# Patient Record
Sex: MALE | Race: WHITE | NOT HISPANIC OR LATINO | URBAN - METROPOLITAN AREA
[De-identification: names, ages, dates, MRNs, and addresses within clinical notes are randomized per-mention and may not be internally consistent; named-entity substitution may affect disease eponyms.]

---

## 2024-08-27 ENCOUNTER — APPOINTMENT (OUTPATIENT)
Dept: GASTROENTEROLOGY | Facility: CLINIC | Age: 59
End: 2024-08-27
Payer: MEDICAID

## 2024-08-27 VITALS
WEIGHT: 131 LBS | HEIGHT: 70 IN | DIASTOLIC BLOOD PRESSURE: 82 MMHG | SYSTOLIC BLOOD PRESSURE: 126 MMHG | HEART RATE: 89 BPM | OXYGEN SATURATION: 98 % | BODY MASS INDEX: 18.75 KG/M2

## 2024-08-27 DIAGNOSIS — Z12.11 SCREENING FOR COLON CANCER: ICD-10-CM

## 2024-08-27 DIAGNOSIS — R19.7 DIARRHEA, UNSPECIFIED TYPE: Primary | ICD-10-CM

## 2024-08-27 PROBLEM — Z20.822 EXPOSURE TO COVID-19 VIRUS: Status: RESOLVED | Noted: 2024-08-27 | Resolved: 2024-08-27

## 2024-08-27 PROBLEM — L08.9 INFECTION OF FINGER: Status: ACTIVE | Noted: 2024-08-27

## 2024-08-27 PROBLEM — F17.200 NICOTINE DEPENDENCE, UNSPECIFIED, UNCOMPLICATED: Status: ACTIVE | Noted: 2017-09-03

## 2024-08-27 PROBLEM — M62.81 MUSCLE WEAKNESS OF UPPER EXTREMITY: Status: ACTIVE | Noted: 2024-08-27

## 2024-08-27 PROBLEM — K21.9 GERD (GASTROESOPHAGEAL REFLUX DISEASE): Status: ACTIVE | Noted: 2024-08-27

## 2024-08-27 PROBLEM — M25.531 RIGHT WRIST PAIN: Status: ACTIVE | Noted: 2024-08-27

## 2024-08-27 PROCEDURE — 99204 OFFICE O/P NEW MOD 45 MIN: CPT | Performed by: INTERNAL MEDICINE

## 2024-08-27 PROCEDURE — 3008F BODY MASS INDEX DOCD: CPT | Performed by: INTERNAL MEDICINE

## 2024-08-27 RX ORDER — OMEPRAZOLE 40 MG/1
1 CAPSULE, DELAYED RELEASE ORAL
COMMUNITY
Start: 2024-06-18 | End: 2024-08-27 | Stop reason: ALTCHOICE

## 2024-08-27 ASSESSMENT — ENCOUNTER SYMPTOMS
SORE THROAT: 0
HEADACHES: 0
FEVER: 0
ROS GI COMMENTS: AS DETAILED ABOVE.
UNEXPECTED WEIGHT CHANGE: 0
VOICE CHANGE: 0
CHILLS: 0
CONFUSION: 0
NUMBNESS: 0
COUGH: 0
TROUBLE SWALLOWING: 0
ARTHRALGIAS: 0
DYSURIA: 0
DIZZINESS: 0
ADENOPATHY: 0
PALPITATIONS: 0
SEIZURES: 0
WHEEZING: 0
BRUISES/BLEEDS EASILY: 0
SHORTNESS OF BREATH: 0
NERVOUS/ANXIOUS: 0
FATIGUE: 0
MYALGIAS: 0
WEAKNESS: 0
HEMATURIA: 0

## 2024-08-27 NOTE — PATIENT INSTRUCTIONS
I have ordered labs to look into your symptoms.        You have been scheduled for a colonoscopy.  You were given instructions for preparing for this test in the office today.  If you have questions about these instructions, please call my office at [phone].    After your procedure, you can expect me to talk to you to go over the results of the procedure. If polyps were removed I will usually be able to tell you my initial thoughts about those polyps and give you some idea of when you should have another colonoscopy.    If any polyps are removed during your procedure or if any biopsies are obtained those specimens will go to the pathologists to review under the microscope. Once those results are available they will be sent to me electronically to review. These results will also be available to you at that time through [employer]. I briefly review all of these results by the next business day to determine if there is any urgent information that needs to be communicated to you right away or anything that would significantly change what I told you at the time of the procedure. If there is nothing urgent this is usually a good sign and I will then do a more extensive review of the result and send you a letter with my final recommendations within a week of the result becoming available. If you have questions or need additional information I urge you to call the office at [phone], but I do ask for patience as I spend a good portion of each day performing procedures like the one you are scheduled for and during those times I am not able to take or return routine phone calls.    You were also given information regarding the schedule for your procedure including the time that you need to arrive to the endoscopy unit.  You will also be contacted 2-3 day prior to your procedure to confirm the final arrival time.  If you have questions about this or if you need to cancel or change this appointment please call my office  at [phone].       Follow up with GI as needed.

## 2024-08-27 NOTE — LETTER
August 27, 2024     Ricci Newton DO  [address]  [address]  [facility] [id number]    Patient: Bob Bolaños   YOB: 1965   Date of Visit: 8/27/2024       Dear Dr. Ricci Newton DO:    Thank you for referring Bob Bolaños to me for evaluation. Below are my notes for this consultation.  If you have questions, please do not hesitate to call me. I look forward to following your patient along with you.       Sincerely,     Jeremy Webster MD      CC: No Recipients  ______________________________________________________________________________________         [facility]    ASSESSMENT and PLAN:       Bob Bolaños is a 59 y.o. male with no significant past medical history who presents for consultation requested by his primary care provider (Ricci Newton DO) for the evaluation of diarrhea.       Diarrhea  Persistent symptoms of loose stool for several months. Unclear etiology. No red flag/alarm symptoms. Suspect symptoms related to dietary issues and would consider lactose intolerance. Will also check labs to evaluate for underlying celiac, EPI, etc. Underlying IBD or microscopic colitis less likely, but will evaluate for that at the time of his colonoscopy.  - labs ordered  - consider fiber supplement  - stop Omeprazole as he denies any symptoms of GERD or other symptoms where PPI treatment would be indicated, side effect of PPIs can include diarrhea      Colon cancer screening  Due for screening colonoscopy.  Essentially asymptomatic at this time.  No previous difficulties with sedation.  No antiplatelet/antithrombotic use.  - colonoscopy scheduled in endo  - OTC bowel prep discussed with patient  - instructions for procedure discussed with patient in the office today and hard copy given to patient today      Follow up with GI as needed.          Jeremy Webster MD        Senior Attending Physician, Gastroenterology    [facility] [place]  [facility]    Clinical   [facility] [employer]        Subjective   HISTORY OF PRESENT ILLNESS:     Chief Complaint  New Patient Visit and Diarrhea    History Of Present Illness:    Bob Bolaños is a 59 y.o. male with no significant past medical history who presents for consultation requested by his primary care provider (Ricci Newton DO) for the evaluation of diarrhea.     There are no notes from his PCP in the EMR. A scanned referral order says that the reason for referral is “diarrhea, screening colonoscopy”. No other records are included. There are no recent labs, imaging, or endoscopy reports in the EMR.      Today, Mr. Bolaños says that he needs to get a colonoscopy done because he has never had one before. He denies any family history of GI disease. He also denies any antiplatelet/anticoagulation.    He also says that he has had loose stools for the last 3-4 months. He will usually have 3 loose BMs in the morning and then no further BMs for the rest of the day. He has not noticed any triggers. No blood in his stool and no abdominal pain. No weight loss. He has not changed his diet in any way. He started Omeprazole about a month ago which he says was started by his PCP because of diarrhea. He deniesany history of heartburn. He takes OTC Acetaminophen, but he denies any NSAID use.    Patient denies any heartburn/GERD, N/V, dysphagia, odynophagia, abdominal pain, constipation, hematemesis, hematochezia, melena, or weight loss.      Review of systems:   Review of Systems   Constitutional:  Negative for chills, fatigue, fever and unexpected weight change.   HENT:  Negative for congestion, sneezing, sore throat, trouble swallowing and voice change.    Respiratory:  Negative for cough, shortness of breath and wheezing.    Cardiovascular:  Negative for chest pain, palpitations and leg swelling.   Gastrointestinal:         As detailed above.  "  Genitourinary:  Negative for dysuria and hematuria.   Musculoskeletal:  Negative for arthralgias and myalgias.   Skin:  Negative for pallor and rash.   Neurological:  Negative for dizziness, seizures, syncope, weakness, numbness and headaches.   Hematological:  Negative for adenopathy. Does not bruise/bleed easily.   Psychiatric/Behavioral:  Negative for confusion. The patient is not nervous/anxious.    All other systems reviewed and are negative.      I performed a complete 10 point review of systems and it is negative except as noted in HPI or above.      PAST HISTORIES:       Past Medical History:  Patient Active Problem List   Diagnosis   • Right wrist pain   • Muscle weakness of upper extremity   • Infection of finger   • Nicotine dependence, unspecified, uncomplicated   • GERD (gastroesophageal reflux disease)     He has a past medical history of Exposure to COVID-19 virus (08/27/2024).    Past Surgical History:  He has no past surgical history on file.      Social History:  He reports that he has been smoking cigarettes. He has never used smokeless tobacco. He reports current alcohol use of about 2.0 standard drinks of alcohol per week. He reports that he does not use drugs.    Family History:  No known family history of GI disease, specifically denies any family history of pancreatitis, Crohn's, colon cancer, gastroesophageal cancer, or ulcerative colitis.    No family history on file.     Allergies:  Patient has no known allergies.      Objective   OBJECTIVE:       Last Recorded Vitals:  Vitals:    08/27/24 1528   BP: 126/82   Pulse: 89   SpO2: 98%   Weight: 59.4 kg (131 lb)   Height: 1.778 m (5' 10\")     /82   Pulse 89   Ht 1.778 m (5' 10\")   Wt 59.4 kg (131 lb)   SpO2 98%   BMI 18.80 kg/m²      Physical Exam:    Physical Exam  Vitals reviewed.   Constitutional:       General: He is not in acute distress.     Appearance: He is not ill-appearing.      Comments: thin   HENT:      Head: " "Normocephalic and atraumatic.   Eyes:      General: No scleral icterus.  Cardiovascular:      Rate and Rhythm: Normal rate and regular rhythm.      Pulses: Normal pulses.      Heart sounds: Normal heart sounds. No murmur heard.  Pulmonary:      Effort: Pulmonary effort is normal. No respiratory distress.      Breath sounds: Normal breath sounds. No wheezing.   Abdominal:      General: Bowel sounds are normal.      Palpations: Abdomen is soft.      Tenderness: There is no abdominal tenderness. There is no rebound.   Musculoskeletal:         General: No swelling or deformity.   Skin:     General: Skin is warm and dry.      Coloration: Skin is not jaundiced.      Findings: No rash.   Neurological:      General: No focal deficit present.      Mental Status: He is alert and oriented to person, place, and time.   Psychiatric:         Mood and Affect: Mood normal.         Behavior: Behavior normal.         Thought Content: Thought content normal.         Judgment: Judgment normal.         Home Medications:  Prior to Admission medications    Not on File         Relevant Results Recent labs reviewed in the EMR.    No results found for: \"WBC\", \"HGB\", \"MCV\", \"PLT\", \"IRON\", \"TIBC\", \"IRONSAT\", \"FERRITIN\", \"[id number]\", \"FOLATE\"    No results found for: \"NA\", \"K\", \"CL\", \"BUN\", \"CREATININE\"    No results found for: \"BILITOT\", \"BILIDIR\", \"ALKPHOS\", \"AST\", \"ALT\", \"LIPASE\"    No results found for: \"CRP\", \"CALPS\"    Radiology: Imaging reviewed in the EMR.  No results found.        "

## 2024-08-27 NOTE — PROGRESS NOTES
[facility]    ASSESSMENT and PLAN:       Bob Bolaños is a 59 y.o. male with no significant past medical history who presents for consultation requested by his primary care provider (Ricci Newton DO) for the evaluation of diarrhea.       Diarrhea  Persistent symptoms of loose stool for several months. Unclear etiology. No red flag/alarm symptoms. Suspect symptoms related to dietary issues and would consider lactose intolerance. Will also check labs to evaluate for underlying celiac, EPI, etc. Underlying IBD or microscopic colitis less likely, but will evaluate for that at the time of his colonoscopy.  - labs ordered  - consider fiber supplement  - stop Omeprazole as he denies any symptoms of GERD or other symptoms where PPI treatment would be indicated, side effect of PPIs can include diarrhea      Colon cancer screening  Due for screening colonoscopy.  Essentially asymptomatic at this time.  No previous difficulties with sedation.  No antiplatelet/antithrombotic use.  - colonoscopy scheduled in endo  - OTC bowel prep discussed with patient  - instructions for procedure discussed with patient in the office today and hard copy given to patient today      Follow up with GI as needed.          Jeremy Webster MD        Senior Attending Physician, Gastroenterology    [facility] [place] [facility]    Clinical   [facility] [employer]        Subjective   HISTORY OF PRESENT ILLNESS:     Chief Complaint  New Patient Visit and Diarrhea    History Of Present Illness:    Bob Bolaños is a 59 y.o. male with no significant past medical history who presents for consultation requested by his primary care provider (Ricci Newton DO) for the evaluation of diarrhea.     There are no notes from his PCP in the EMR. A scanned referral order says that the reason for referral is “diarrhea, screening colonoscopy”. No  other records are included. There are no recent labs, imaging, or endoscopy reports in the EMR.      Today, Mr. Bolaños says that he needs to get a colonoscopy done because he has never had one before. He denies any family history of GI disease. He also denies any antiplatelet/anticoagulation.    He also says that he has had loose stools for the last 3-4 months. He will usually have 3 loose BMs in the morning and then no further BMs for the rest of the day. He has not noticed any triggers. No blood in his stool and no abdominal pain. No weight loss. He has not changed his diet in any way. He started Omeprazole about a month ago which he says was started by his PCP because of diarrhea. He deniesany history of heartburn. He takes OTC Acetaminophen, but he denies any NSAID use.    Patient denies any heartburn/GERD, N/V, dysphagia, odynophagia, abdominal pain, constipation, hematemesis, hematochezia, melena, or weight loss.      Review of systems:   Review of Systems   Constitutional:  Negative for chills, fatigue, fever and unexpected weight change.   HENT:  Negative for congestion, sneezing, sore throat, trouble swallowing and voice change.    Respiratory:  Negative for cough, shortness of breath and wheezing.    Cardiovascular:  Negative for chest pain, palpitations and leg swelling.   Gastrointestinal:         As detailed above.   Genitourinary:  Negative for dysuria and hematuria.   Musculoskeletal:  Negative for arthralgias and myalgias.   Skin:  Negative for pallor and rash.   Neurological:  Negative for dizziness, seizures, syncope, weakness, numbness and headaches.   Hematological:  Negative for adenopathy. Does not bruise/bleed easily.   Psychiatric/Behavioral:  Negative for confusion. The patient is not nervous/anxious.    All other systems reviewed and are negative.      I performed a complete 10 point review of systems and it is negative except as noted in HPI or above.      PAST HISTORIES:       Past Medical  "History:  Patient Active Problem List   Diagnosis    Right wrist pain    Muscle weakness of upper extremity    Infection of finger    Nicotine dependence, unspecified, uncomplicated    GERD (gastroesophageal reflux disease)     He has a past medical history of Exposure to COVID-19 virus (08/27/2024).    Past Surgical History:  He has no past surgical history on file.      Social History:  He reports that he has been smoking cigarettes. He has never used smokeless tobacco. He reports current alcohol use of about 2.0 standard drinks of alcohol per week. He reports that he does not use drugs.    Family History:  No known family history of GI disease, specifically denies any family history of pancreatitis, Crohn's, colon cancer, gastroesophageal cancer, or ulcerative colitis.    No family history on file.     Allergies:  Patient has no known allergies.      Objective   OBJECTIVE:       Last Recorded Vitals:  Vitals:    08/27/24 1528   BP: 126/82   Pulse: 89   SpO2: 98%   Weight: 59.4 kg (131 lb)   Height: 1.778 m (5' 10\")     /82   Pulse 89   Ht 1.778 m (5' 10\")   Wt 59.4 kg (131 lb)   SpO2 98%   BMI 18.80 kg/m²      Physical Exam:    Physical Exam  Vitals reviewed.   Constitutional:       General: He is not in acute distress.     Appearance: He is not ill-appearing.      Comments: thin   HENT:      Head: Normocephalic and atraumatic.   Eyes:      General: No scleral icterus.  Cardiovascular:      Rate and Rhythm: Normal rate and regular rhythm.      Pulses: Normal pulses.      Heart sounds: Normal heart sounds. No murmur heard.  Pulmonary:      Effort: Pulmonary effort is normal. No respiratory distress.      Breath sounds: Normal breath sounds. No wheezing.   Abdominal:      General: Bowel sounds are normal.      Palpations: Abdomen is soft.      Tenderness: There is no abdominal tenderness. There is no rebound.   Musculoskeletal:         General: No swelling or deformity.   Skin:     General: Skin is warm " "and dry.      Coloration: Skin is not jaundiced.      Findings: No rash.   Neurological:      General: No focal deficit present.      Mental Status: He is alert and oriented to person, place, and time.   Psychiatric:         Mood and Affect: Mood normal.         Behavior: Behavior normal.         Thought Content: Thought content normal.         Judgment: Judgment normal.         Home Medications:  Prior to Admission medications    Not on File         Relevant Results Recent labs reviewed in the EMR.    No results found for: \"WBC\", \"HGB\", \"MCV\", \"PLT\", \"IRON\", \"TIBC\", \"IRONSAT\", \"FERRITIN\", \"[id number]\", \"FOLATE\"    No results found for: \"NA\", \"K\", \"CL\", \"BUN\", \"CREATININE\"    No results found for: \"BILITOT\", \"BILIDIR\", \"ALKPHOS\", \"AST\", \"ALT\", \"LIPASE\"    No results found for: \"CRP\", \"CALPS\"    Radiology: Imaging reviewed in the EMR.  No results found.        "

## 2024-08-27 NOTE — H&P (VIEW-ONLY)
[facility]    ASSESSMENT and PLAN:       Bob Bolaños is a 59 y.o. male with no significant past medical history who presents for consultation requested by his primary care provider (Ricci Newton DO) for the evaluation of diarrhea.       Diarrhea  Persistent symptoms of loose stool for several months. Unclear etiology. No red flag/alarm symptoms. Suspect symptoms related to dietary issues and would consider lactose intolerance. Will also check labs to evaluate for underlying celiac, EPI, etc. Underlying IBD or microscopic colitis less likely, but will evaluate for that at the time of his colonoscopy.  - labs ordered  - consider fiber supplement  - stop Omeprazole as he denies any symptoms of GERD or other symptoms where PPI treatment would be indicated, side effect of PPIs can include diarrhea      Colon cancer screening  Due for screening colonoscopy.  Essentially asymptomatic at this time.  No previous difficulties with sedation.  No antiplatelet/antithrombotic use.  - colonoscopy scheduled in endo  - OTC bowel prep discussed with patient  - instructions for procedure discussed with patient in the office today and hard copy given to patient today      Follow up with GI as needed.          Jeremy Webster MD        Senior Attending Physician, Gastroenterology    [facility] [place] [facility] [place] [place]    Clinical   [facility] [employer]        Subjective   HISTORY OF PRESENT ILLNESS:     Chief Complaint  New Patient Visit and Diarrhea    History Of Present Illness:    Bob Bolaños is a 59 y.o. male with no significant past medical history who presents for consultation requested by his primary care provider (Ricci Newton DO) for the evaluation of diarrhea.     There are no notes from his PCP in the EMR. A scanned referral order says that the reason for referral is “diarrhea, screening colonoscopy”. No  other records are included. There are no recent labs, imaging, or endoscopy reports in the EMR.      Today, Mr. Bolaños says that he needs to get a colonoscopy done because he has never had one before. He denies any family history of GI disease. He also denies any antiplatelet/anticoagulation.    He also says that he has had loose stools for the last 3-4 months. He will usually have 3 loose BMs in the morning and then no further BMs for the rest of the day. He has not noticed any triggers. No blood in his stool and no abdominal pain. No weight loss. He has not changed his diet in any way. He started Omeprazole about a month ago which he says was started by his PCP because of diarrhea. He deniesany history of heartburn. He takes OTC Acetaminophen, but he denies any NSAID use.    Patient denies any heartburn/GERD, N/V, dysphagia, odynophagia, abdominal pain, constipation, hematemesis, hematochezia, melena, or weight loss.      Review of systems:   Review of Systems   Constitutional:  Negative for chills, fatigue, fever and unexpected weight change.   HENT:  Negative for congestion, sneezing, sore throat, trouble swallowing and voice change.    Respiratory:  Negative for cough, shortness of breath and wheezing.    Cardiovascular:  Negative for chest pain, palpitations and leg swelling.   Gastrointestinal:         As detailed above.   Genitourinary:  Negative for dysuria and hematuria.   Musculoskeletal:  Negative for arthralgias and myalgias.   Skin:  Negative for pallor and rash.   Neurological:  Negative for dizziness, seizures, syncope, weakness, numbness and headaches.   Hematological:  Negative for adenopathy. Does not bruise/bleed easily.   Psychiatric/Behavioral:  Negative for confusion. The patient is not nervous/anxious.    All other systems reviewed and are negative.      I performed a complete 10 point review of systems and it is negative except as noted in HPI or above.      PAST HISTORIES:       Past Medical  "History:  Patient Active Problem List   Diagnosis    Right wrist pain    Muscle weakness of upper extremity    Infection of finger    Nicotine dependence, unspecified, uncomplicated    GERD (gastroesophageal reflux disease)     He has a past medical history of Exposure to COVID-19 virus (08/27/2024).    Past Surgical History:  He has no past surgical history on file.      Social History:  He reports that he has been smoking cigarettes. He has never used smokeless tobacco. He reports current alcohol use of about 2.0 standard drinks of alcohol per week. He reports that he does not use drugs.    Family History:  No known family history of GI disease, specifically denies any family history of pancreatitis, Crohn's, colon cancer, gastroesophageal cancer, or ulcerative colitis.    No family history on file.     Allergies:  Patient has no known allergies.      Objective   OBJECTIVE:       Last Recorded Vitals:  Vitals:    08/27/24 1528   BP: 126/82   Pulse: 89   SpO2: 98%   Weight: 59.4 kg (131 lb)   Height: 1.778 m (5' 10\")     /82   Pulse 89   Ht 1.778 m (5' 10\")   Wt 59.4 kg (131 lb)   SpO2 98%   BMI 18.80 kg/m²      Physical Exam:    Physical Exam  Vitals reviewed.   Constitutional:       General: He is not in acute distress.     Appearance: He is not ill-appearing.      Comments: thin   HENT:      Head: Normocephalic and atraumatic.   Eyes:      General: No scleral icterus.  Cardiovascular:      Rate and Rhythm: Normal rate and regular rhythm.      Pulses: Normal pulses.      Heart sounds: Normal heart sounds. No murmur heard.  Pulmonary:      Effort: Pulmonary effort is normal. No respiratory distress.      Breath sounds: Normal breath sounds. No wheezing.   Abdominal:      General: Bowel sounds are normal.      Palpations: Abdomen is soft.      Tenderness: There is no abdominal tenderness. There is no rebound.   Musculoskeletal:         General: No swelling or deformity.   Skin:     General: Skin is warm " "and dry.      Coloration: Skin is not jaundiced.      Findings: No rash.   Neurological:      General: No focal deficit present.      Mental Status: He is alert and oriented to person, place, and time.   Psychiatric:         Mood and Affect: Mood normal.         Behavior: Behavior normal.         Thought Content: Thought content normal.         Judgment: Judgment normal.         Home Medications:  Prior to Admission medications    Not on File         Relevant Results Recent labs reviewed in the EMR.    No results found for: \"WBC\", \"HGB\", \"MCV\", \"PLT\", \"IRON\", \"TIBC\", \"IRONSAT\", \"FERRITIN\", \"[id number]\", \"FOLATE\"    No results found for: \"NA\", \"K\", \"CL\", \"BUN\", \"CREATININE\"    No results found for: \"BILITOT\", \"BILIDIR\", \"ALKPHOS\", \"AST\", \"ALT\", \"LIPASE\"    No results found for: \"CRP\", \"CALPS\"    Radiology: Imaging reviewed in the EMR.  No results found.        "

## 2024-09-16 ENCOUNTER — ANESTHESIA EVENT (OUTPATIENT)
Dept: GASTROENTEROLOGY | Facility: HOSPITAL | Age: 59
End: 2024-09-16
Payer: COMMERCIAL

## 2024-09-16 ENCOUNTER — PREP FOR PROCEDURE (OUTPATIENT)
Dept: GASTROENTEROLOGY | Facility: CLINIC | Age: 59
End: 2024-09-16
Payer: COMMERCIAL

## 2024-09-16 RX ORDER — SODIUM CHLORIDE 9 MG/ML
20 INJECTION, SOLUTION INTRAVENOUS CONTINUOUS
Status: CANCELLED | OUTPATIENT
Start: 2024-09-16

## 2024-09-17 ENCOUNTER — ANESTHESIA (OUTPATIENT)
Dept: GASTROENTEROLOGY | Facility: HOSPITAL | Age: 59
End: 2024-09-17
Payer: COMMERCIAL

## 2024-09-17 ENCOUNTER — HOSPITAL ENCOUNTER (OUTPATIENT)
Dept: GASTROENTEROLOGY | Facility: HOSPITAL | Age: 59
Discharge: HOME | End: 2024-09-17
Payer: COMMERCIAL

## 2024-09-17 VITALS
RESPIRATION RATE: 16 BRPM | HEART RATE: 63 BPM | TEMPERATURE: 98.2 F | HEIGHT: 70 IN | OXYGEN SATURATION: 98 % | DIASTOLIC BLOOD PRESSURE: 94 MMHG | BODY MASS INDEX: 18.75 KG/M2 | WEIGHT: 131 LBS | SYSTOLIC BLOOD PRESSURE: 142 MMHG

## 2024-09-17 DIAGNOSIS — Z12.11 SCREENING FOR COLON CANCER: ICD-10-CM

## 2024-09-17 PROBLEM — J45.909 ASTHMA: Status: ACTIVE | Noted: 2024-09-17

## 2024-09-17 PROCEDURE — 3700000001 HC GENERAL ANESTHESIA TIME - INITIAL BASE CHARGE

## 2024-09-17 PROCEDURE — 7100000010 HC PHASE TWO TIME - EACH INCREMENTAL 1 MINUTE

## 2024-09-17 PROCEDURE — 2500000005 HC RX 250 GENERAL PHARMACY W/O HCPCS: Performed by: NURSE ANESTHETIST, CERTIFIED REGISTERED

## 2024-09-17 PROCEDURE — 2500000004 HC RX 250 GENERAL PHARMACY W/ HCPCS (ALT 636 FOR OP/ED): Performed by: NURSE ANESTHETIST, CERTIFIED REGISTERED

## 2024-09-17 PROCEDURE — 3700000002 HC GENERAL ANESTHESIA TIME - EACH INCREMENTAL 1 MINUTE

## 2024-09-17 PROCEDURE — 2500000004 HC RX 250 GENERAL PHARMACY W/ HCPCS (ALT 636 FOR OP/ED): Performed by: INTERNAL MEDICINE

## 2024-09-17 PROCEDURE — 7100000009 HC PHASE TWO TIME - INITIAL BASE CHARGE

## 2024-09-17 PROCEDURE — 45385 COLONOSCOPY W/LESION REMOVAL: CPT | Performed by: INTERNAL MEDICINE

## 2024-09-17 RX ORDER — PROPOFOL 10 MG/ML
INJECTION, EMULSION INTRAVENOUS AS NEEDED
Status: DISCONTINUED | OUTPATIENT
Start: 2024-09-17 | End: 2024-09-17

## 2024-09-17 RX ORDER — SODIUM CHLORIDE 9 MG/ML
20 INJECTION, SOLUTION INTRAVENOUS CONTINUOUS
Status: DISCONTINUED | OUTPATIENT
Start: 2024-09-17 | End: 2024-09-18 | Stop reason: HOSPADM

## 2024-09-17 RX ORDER — FENTANYL CITRATE 50 UG/ML
INJECTION, SOLUTION INTRAMUSCULAR; INTRAVENOUS AS NEEDED
Status: DISCONTINUED | OUTPATIENT
Start: 2024-09-17 | End: 2024-09-17

## 2024-09-17 RX ORDER — LIDOCAINE HYDROCHLORIDE 20 MG/ML
INJECTION, SOLUTION INFILTRATION; PERINEURAL AS NEEDED
Status: DISCONTINUED | OUTPATIENT
Start: 2024-09-17 | End: 2024-09-17

## 2024-09-17 SDOH — HEALTH STABILITY: MENTAL HEALTH: CURRENT SMOKER: 1

## 2024-09-17 ASSESSMENT — PAIN SCALES - GENERAL
PAINLEVEL_OUTOF10: 0 - NO PAIN
PAIN_LEVEL: 0
PAINLEVEL_OUTOF10: 0 - NO PAIN

## 2024-09-17 ASSESSMENT — PAIN - FUNCTIONAL ASSESSMENT
PAIN_FUNCTIONAL_ASSESSMENT: 0-10

## 2024-09-17 ASSESSMENT — COLUMBIA-SUICIDE SEVERITY RATING SCALE - C-SSRS
6. HAVE YOU EVER DONE ANYTHING, STARTED TO DO ANYTHING, OR PREPARED TO DO ANYTHING TO END YOUR LIFE?: NO
1. IN THE PAST MONTH, HAVE YOU WISHED YOU WERE DEAD OR WISHED YOU COULD GO TO SLEEP AND NOT WAKE UP?: NO
2. HAVE YOU ACTUALLY HAD ANY THOUGHTS OF KILLING YOURSELF?: NO

## 2024-09-17 NOTE — ANESTHESIA PREPROCEDURE EVALUATION
Patient: Bob Bolaños    Procedure Information       Anesthesia Start Date/Time: 09/17/24 1105    Scheduled providers: Jeremy Webster MD    Procedure: COLONOSCOPY    Location:  [facility]            Relevant Problems   Anesthesia (within normal limits)      Cardiac (within normal limits)      Neuro (within normal limits)      GI   (+) GERD (gastroesophageal reflux disease)      /Renal (within normal limits)      Liver (within normal limits)      Endocrine (within normal limits)      Hematology (within normal limits)      HEENT (within normal limits)      ID   (+) Infection of finger       Clinical information reviewed:   Tobacco  Allergies  Meds   Med Hx  Surg Hx   Fam Hx  Soc Hx        NPO Detail:  NPO/Void Status  Carbohydrate Drink Given Prior to Surgery? : Y  Date of Last Liquid: 09/17/24  Time of Last Liquid: 0400  Date of Last Solid: 09/15/24  Time of Last Solid: 2100  Last Intake Type: Clear fluids  Time of Last Void: 1014         Physical Exam    Airway  Mallampati: II  TM distance: >3 FB  Neck ROM: full     Cardiovascular - normal exam  Rhythm: regular  Rate: normal     Dental   Comments: Poor dentition   Pulmonary - normal exam     Abdominal - normal exam             Anesthesia Plan    History of general anesthesia?: yes  History of complications of general anesthesia?: no    ASA 2     MAC     The patient is a current smoker.  Patient was previously instructed to abstain from smoking on day of procedure.  Patient smoked on day of procedure.    intravenous induction   Anesthetic plan and risks discussed with patient.

## 2024-09-17 NOTE — ANESTHESIA POSTPROCEDURE EVALUATION
Patient: Bob Bolaños    Procedure Summary       Date: 09/17/24 Room / Location: [facility]    Anesthesia Start: 1105 Anesthesia Stop: 1138    Procedure: COLONOSCOPY Diagnosis: Screening for colon cancer    Scheduled Providers: Jeremy Webster MD Responsible Provider: CARIN Alaniz    Anesthesia Type: MAC ASA Status: 2            Anesthesia Type: MAC    Vitals Value Taken Time   /94 09/17/24 1157   Temp 36.8 °C (98.2 °F) 09/17/24 1157   Pulse 63 09/17/24 1157   Resp 16 09/17/24 1157   SpO2 98 % 09/17/24 1157       Anesthesia Post Evaluation    Patient location during evaluation: bedside  Patient participation: complete - patient participated  Level of consciousness: awake and alert  Pain score: 0  Pain management: adequate  Airway patency: patent  Cardiovascular status: acceptable and hemodynamically stable  Respiratory status: acceptable  Hydration status: acceptable  Postoperative Nausea and Vomiting: none        There were no known notable events for this encounter.

## 2024-09-17 NOTE — LETTER
September 23, 2024     Bob Bolaños  [address] [facility] [address]      Dear Mr. Bolaños:    Below are the results from your recent visit:        The polyps that I removed during your recent colonoscopy were tubular adenomas on pathology.  This type of polyp is not a cancer, but it is the type of polyp that could grow into a cancer over time.  Any polyps that were removed will not grow into a cancer, but having polyps like this can increase your risk of developing other polyps or eventually a cancer.  Because of that risk I would recommend that you have another colonoscopy in about 3 years to look for other polyps.     If you have any other questions or concerns please do not hesitate to call me at my office at [phone].     Repeat Colonoscopy Interval: 3 years          Sincerely,        Jeremy Webster MD                Resulted Orders   Surgical Pathology Exam   Result Value Ref Range    Case Report       Surgical Pathology                                Case: [id number]                                  Authorizing Provider:  Jeremy Webster MD        Collected:           09/17/2024 1123              Ordering Location:     [facility]    Received:            09/17/2024 [address] [place] [address]                                                                     Pathologist:           Phillip Ochoa MD                                                                  Specimen:    COLON - HEPATIC FLEXURE POLYP, x2                                                          FINAL DIAGNOSIS       A. COLON, HEPATIC FLEXURE POLYPX2:  -- FRAGMENTS OF TUBULAR ADENOMA                   By the signature on this report, the individual or group listed as making the Final Interpretation/Diagnosis certifies that they have reviewed this case.       Clinical History       Colon cancer screening Z12.11      Gross Description       A: Received in formalin, labeled with the patient's name and  "hospital number and \"1, hepatic flexure polyp, x 2\", are multiple fragments of tan, soft tissue aggregating to 1.0 x 0.4 x 0.3 cm. The specimen is submitted in toto in one cassette.  SMS         "

## 2024-09-18 NOTE — ADDENDUM NOTE
Encounter addended by: Honey Garcia RN on: 9/18/2024 1:15 PM   Actions taken: Contacts section saved, Flowsheet accepted

## 2024-09-20 NOTE — ADDENDUM NOTE
Encounter addended by: Keren Witt RN on: 9/20/2024 10:21 AM   Actions taken: Check Out activity completed

## 2024-09-23 LAB
LABORATORY COMMENT REPORT: NORMAL
PATH REPORT.FINAL DX SPEC: NORMAL
PATH REPORT.GROSS SPEC: NORMAL
PATH REPORT.RELEVANT HX SPEC: NORMAL
PATH REPORT.TOTAL CANCER: NORMAL

## 2025-05-27 ENCOUNTER — HOSPITAL ENCOUNTER (EMERGENCY)
Facility: HOSPITAL | Age: 60
Discharge: HOME | End: 2025-05-27
Payer: COMMERCIAL

## 2025-05-27 ENCOUNTER — APPOINTMENT (OUTPATIENT)
Dept: RADIOLOGY | Facility: HOSPITAL | Age: 60
End: 2025-05-27
Payer: COMMERCIAL

## 2025-05-27 VITALS
TEMPERATURE: 97.7 F | BODY MASS INDEX: 18.9 KG/M2 | DIASTOLIC BLOOD PRESSURE: 94 MMHG | RESPIRATION RATE: 17 BRPM | HEART RATE: 70 BPM | HEIGHT: 71 IN | SYSTOLIC BLOOD PRESSURE: 157 MMHG | OXYGEN SATURATION: 98 % | WEIGHT: 135 LBS

## 2025-05-27 DIAGNOSIS — S46.912A STRAIN OF LEFT SHOULDER, INITIAL ENCOUNTER: Primary | ICD-10-CM

## 2025-05-27 PROCEDURE — 73030 X-RAY EXAM OF SHOULDER: CPT | Mod: LEFT SIDE | Performed by: RADIOLOGY

## 2025-05-27 PROCEDURE — 99283 EMERGENCY DEPT VISIT LOW MDM: CPT

## 2025-05-27 PROCEDURE — 73030 X-RAY EXAM OF SHOULDER: CPT | Mod: LT

## 2025-05-27 RX ORDER — NAPROXEN 500 MG/1
500 TABLET ORAL
Qty: 14 TABLET | Refills: 0 | Status: SHIPPED | OUTPATIENT
Start: 2025-05-27 | End: 2025-06-03

## 2025-05-27 ASSESSMENT — PAIN DESCRIPTION - ORIENTATION: ORIENTATION: LEFT

## 2025-05-27 ASSESSMENT — PAIN DESCRIPTION - PAIN TYPE: TYPE: ACUTE PAIN

## 2025-05-27 ASSESSMENT — PAIN DESCRIPTION - LOCATION: LOCATION: SHOULDER

## 2025-05-27 ASSESSMENT — PAIN SCALES - GENERAL: PAINLEVEL_OUTOF10: 10 - WORST POSSIBLE PAIN

## 2025-05-27 ASSESSMENT — PAIN DESCRIPTION - DESCRIPTORS: DESCRIPTORS: RADIATING

## 2025-05-27 ASSESSMENT — PAIN - FUNCTIONAL ASSESSMENT: PAIN_FUNCTIONAL_ASSESSMENT: 0-10

## 2025-05-27 NOTE — Clinical Note
Bob Bolaños was seen and treated in our emergency department on 5/27/2025.  He may return to work on 05/30/2025.       If you have any questions or concerns, please don't hesitate to call.      MARIELENA Miranda-CNP

## 2025-05-27 NOTE — ED PROVIDER NOTES
"    HPI   Chief Complaint   Patient presents with    Shoulder Pain     Shoulder pain x 2 weeks        This is a 59-year-old  male that is presenting to the emergency room with complaints of left shoulder pain for the past 2 weeks.  The patient denies any specific mechanism of injury.  The patient reports that he has a dull throbbing pain mostly when he is trying to lift the shoulder.  Denies any weakness or paresthesias.  The patient has been trying to doctor on the shoulder at home.  States that its gotten progressively worse.  The patient is right-hand dominant.  Denies any prior injury.  He rates his discomfort a 10 out of 10 on the pain scale.      History provided by:  Patient   used: No                          No data recorded                Patient History   Medical History[1]  Surgical History[2]  Family History[3]  Social History[4]    Physical Exam   Visit Vitals  BP (!) 172/103   Pulse 74   Temp 36.5 °C (97.7 °F)   Resp 18   Ht 1.803 m (5' 11\")   Wt 61.2 kg (135 lb)   SpO2 99%   BMI 18.83 kg/m²   Smoking Status Every Day   BSA 1.75 m²      Physical Exam  Vitals and nursing note reviewed.   HENT:      Head: Normocephalic and atraumatic.   Cardiovascular:      Rate and Rhythm: Normal rate and regular rhythm.      Pulses: Normal pulses.      Heart sounds: Normal heart sounds.   Pulmonary:      Effort: Pulmonary effort is normal.      Breath sounds: Normal breath sounds.   Musculoskeletal:      Cervical back: Normal range of motion. No tenderness.      Comments: No midline spinal tenderness.  Moves all other extremities with normal range of motion and muscle strength except the left shoulder.  The patient has pain with abduction of the extremity.  Hand grasps are strong and equal bilaterally.  The patient has no reproducible pain with palpation of the shoulder joint.  No swelling or erythema appreciated.  Hand grasps are strong and equal bilaterally.  Pulses are palpable and " strong.   Skin:     General: Skin is warm.      Capillary Refill: Capillary refill takes less than 2 seconds.   Neurological:      General: No focal deficit present.      Mental Status: He is alert.   Psychiatric:         Mood and Affect: Mood normal.         XR shoulder left 2+ views    (Results Pending)       Labs Reviewed - No data to display      ED Course & MDM   Diagnoses as of 05/27/25 0941   Strain of left shoulder, initial encounter           Medical Decision Making  The patient was seen and evaluated by the nurse practitioner, Dee Dee Herrera.  The patient is presenting to the emergency room with complaints of left shoulder pain.  The patient did not have any reproducible pain with palpation of the shoulder.  No evidence of septic joint.  An x-ray of the shoulder was obtained and showed no acute osseous abnormality.  The patient was notified of the imaging results.  The patient was provided prescription for naproxen for pain.  We suspect that the patient's injury may be as a result result of a ligamentous injury or rotator cuff injury.  The patient was referred to orthopedics for further evaluation and treatment.  The patient was discharged in stable condition with computer discharge instructions given.  He is to return if worse in any way.           Your medication list      You have not been prescribed any medications.         Procedure       *This report was transcribed using voice recognition software.  Every effort was made to ensure accuracy; however, inadvertent computerized transcription errors may be present.*  Dee Dee Herrera APRN-CNP  05/27/25           [1]   Past Medical History:  Diagnosis Date    Exposure to COVID-19 virus 08/27/2024   [2] No past surgical history on file.  [3] No family history on file.  [4]   Social History  Tobacco Use    Smoking status: Every Day     Current packs/day: 1.00     Types: Cigarettes    Smokeless tobacco: Never   Vaping Use    Vaping status: Never Used   Substance  Use Topics    Alcohol use: Yes     Alcohol/week: 2.0 standard drinks of alcohol     Types: 2 Cans of beer per week     Comment: a day    Drug use: Yes     Types: Marijuana        MARIELENA Miranda-CRISSY  05/27/25 0941

## 2025-05-30 ENCOUNTER — OFFICE VISIT (OUTPATIENT)
Dept: ORTHOPEDIC SURGERY | Facility: CLINIC | Age: 60
End: 2025-05-30
Payer: COMMERCIAL

## 2025-05-30 VITALS — BODY MASS INDEX: 19.33 KG/M2 | HEIGHT: 70 IN | WEIGHT: 135 LBS

## 2025-05-30 DIAGNOSIS — M25.512 ACUTE PAIN OF LEFT SHOULDER: Primary | ICD-10-CM

## 2025-05-30 DIAGNOSIS — M67.912 DISORDER OF LEFT ROTATOR CUFF: ICD-10-CM

## 2025-05-30 PROCEDURE — 20610 DRAIN/INJ JOINT/BURSA W/O US: CPT | Performed by: STUDENT IN AN ORGANIZED HEALTH CARE EDUCATION/TRAINING PROGRAM

## 2025-05-30 PROCEDURE — 3008F BODY MASS INDEX DOCD: CPT | Performed by: STUDENT IN AN ORGANIZED HEALTH CARE EDUCATION/TRAINING PROGRAM

## 2025-05-30 PROCEDURE — 99204 OFFICE O/P NEW MOD 45 MIN: CPT | Performed by: STUDENT IN AN ORGANIZED HEALTH CARE EDUCATION/TRAINING PROGRAM

## 2025-05-30 RX ORDER — LIDOCAINE HYDROCHLORIDE 10 MG/ML
5 INJECTION, SOLUTION INFILTRATION; PERINEURAL
Status: COMPLETED | OUTPATIENT
Start: 2025-05-30 | End: 2025-05-30

## 2025-05-30 RX ORDER — TRIAMCINOLONE ACETONIDE 40 MG/ML
80 INJECTION, SUSPENSION INTRA-ARTICULAR; INTRAMUSCULAR
Status: COMPLETED | OUTPATIENT
Start: 2025-05-30 | End: 2025-05-30

## 2025-05-30 RX ADMIN — TRIAMCINOLONE ACETONIDE 80 MG: 40 INJECTION, SUSPENSION INTRA-ARTICULAR; INTRAMUSCULAR at 08:46

## 2025-05-30 RX ADMIN — LIDOCAINE HYDROCHLORIDE 5 ML: 10 INJECTION, SOLUTION INFILTRATION; PERINEURAL at 08:46

## 2025-05-30 ASSESSMENT — PAIN - FUNCTIONAL ASSESSMENT: PAIN_FUNCTIONAL_ASSESSMENT: NO/DENIES PAIN

## 2025-05-30 NOTE — PROGRESS NOTES
CHIEF COMPLAINT:   Chief Complaint   Patient presents with    Left Shoulder - Pain       History: 59 y.o. male presents to the office today for left shoulder problems for the past 2.5 weeks. They are currently immobilized in a sling. No Hx of trauma, Sx, or injections to the area. Pain is anterior and superior to the GH joint which goes down the upper arm. Worse with movement, better at rest. Reports crepitus. Denies numbness/tingling. Denies associated neck pain. No diabetes. Seem in the ED and had XR done 5/27/2025. Naproxen PRN.        Past medical history, past surgical history, medications, allergies, family history, social history, and review of systems were reviewed today.    A 12 point review of systems was negative other than as stated in the HPI.    Medical History[1]     Allergies[2]     Surgical History[3]     Family History[4]     Social History     Socioeconomic History    Marital status: Single     Spouse name: Not on file    Number of children: Not on file    Years of education: Not on file    Highest education level: Not on file   Occupational History    Not on file   Tobacco Use    Smoking status: Every Day     Current packs/day: 1.00     Types: Cigarettes    Smokeless tobacco: Never   Vaping Use    Vaping status: Never Used   Substance and Sexual Activity    Alcohol use: Yes     Alcohol/week: 2.0 standard drinks of alcohol     Types: 2 Cans of beer per week     Comment: a day    Drug use: Yes     Types: Marijuana    Sexual activity: Not on file   Other Topics Concern    Not on file   Social History Narrative    Not on file     Social Drivers of Health     Financial Resource Strain: Not on file   Food Insecurity: Not on file   Transportation Needs: Not on file   Physical Activity: Not on file   Stress: Not on file   Social Connections: Not on file   Intimate Partner Violence: Not on file   Housing Stability: Not on file        CURRENT MEDICATIONS:   Current Medications[5]    Physical  "Examination:      9/17/2024    10:13 AM 9/17/2024    11:37 AM 9/17/2024    11:47 AM 9/17/2024    11:57 AM 5/27/2025     8:34 AM 5/27/2025     9:50 AM 5/30/2025     8:32 AM   Vitals   Systolic 159 112 118 142 172 157    Diastolic 106 99 90 94 103 94    BP Location      Right arm    Heart Rate 71 77 61 63 74 70    Temp 36.7 °C (98.1 °F) 36.9 °C (98.4 °F)  36.8 °C (98.2 °F) 36.5 °C (97.7 °F)     Resp 16 16 16 16 18 17    Height 1.778 m (5' 10\")    1.803 m (5' 11\")  1.778 m (5' 10\")   Weight (lb) 131    135  135   BMI 18.8 kg/m2    18.83 kg/m2  19.37 kg/m2   BSA (m2) 1.71 m2    1.75 m2  1.74 m2   Visit Report       Report      Body mass index is 19.37 kg/m².    Well-appearing, appears stated age, pleasant and cooperative, appropriate mood and behavior. Height and weight reviewed. Alert and oriented x3.  Auditory function intact.  No acute distress.  Intact ocular function, YOLANDA, EOMI. Breathing is unlabored .  There is no evidence of jugular venous distension. Skin appearance is normal without evidence of rash or other lesions. 2+ radial pulses bilaterally, fingers pink and wwp, good capillary refill, no pitting edema. No appreciable lymphadenopathy in bilateral upper extremities. SILT throughout both upper extremities, median/radial/ulnar/musculocutaneous/axillary nerve motor and sensory intact (except for abnormalities noted in focused musculoskeletal exam section below).     Neck exam: Full range of motion of the neck in flexion/extension and rotational movements. No significant areas of tenderness to palpation in the neck.    On exam of bilateral upper extremities, diminished active range of motion of the left shoulder secondary to pain, forward flexion to 90, external Tatian to 30, internal Tatian to the side.  On the right there is forward flexion 160, external Tatian of 30.  Rotator cuff strength is preserved but painful on the left side.  Passive range of motion is symmetric.  Pain with Neer and Bergeron " maneuvers of the left shoulder.    Imaging: Radiographs of the left shoulder reviewed today.  Personally interpreted by myself.  Preserved glenohumeral joint space.  Preserved acromiohumeral interval.  No acute fractures noted.       Assessment: Left rotator cuff strain    Plan: Patient's symptoms, test result and exam are consistent with a diagnosis of rotator cuff strain.  We did discuss the natural history of this.  Conservative management is often the first-line treatment for this, which includes physical therapy, injections and activity modification.  In rare cases of recalcitrant pain, surgery may be indicated, but this is only after extensive nonoperative care.  Patient understands this.  They wish to proceed with PT and injection.    Injection was performed, please see separate procedure note, patient tolerated well.     Patient ID: Bob Bolaños is a 59 y.o. male.    L Inj/Asp: L subacromial bursa on 5/30/2025 8:46 AM  Indications: pain  Details: 22 G needle, posterior approach  Medications: 80 mg triamcinolone acetonide 40 mg/mL; 5 mL lidocaine 10 mg/mL (1 %)  Outcome: tolerated well, no immediate complications  Procedure, treatment alternatives, risks and benefits explained, specific risks discussed. Consent was given by the patient. Immediately prior to procedure a time out was called to verify the correct patient, procedure, equipment, support staff and site/side marked as required. Patient was prepped and draped in the usual sterile fashion.           Dragon software was used to dictate this note, please be aware that minor errors in transcription may be present.    Ramos Luke MD    Shoulder/Elbow Surgery  [facility]         [1]   Past Medical History:  Diagnosis Date    Exposure to COVID-19 virus 08/27/2024   [2] No Known Allergies  [3] No past surgical history on file.  [4] No family history on file.  [5]   Current Outpatient  Medications   Medication Sig Dispense Refill    naproxen (Naprosyn) 500 mg tablet Take 1 tablet (500 mg) by mouth 2 times daily (morning and late afternoon) for 7 days. 14 tablet 0     No current facility-administered medications for this visit.

## 2025-05-30 NOTE — LETTER
May 30, 2025     Patient: Bob Bolaños   YOB: 1965   Date of Visit: 5/30/2025       To Whom It May Concern:    Bob Bolaños was seen in my clinic on 5/30/2025 at 8:15 am. Please excuse Bob for his absence from work on this day to make the appointment. He may return to work on 5/31/2025 with no restrictions.     If you have any questions or concerns, please don't hesitate to call.         Sincerely,         Ramos Luke MD        CC: No Recipients